# Patient Record
Sex: FEMALE | Race: WHITE | NOT HISPANIC OR LATINO | Employment: FULL TIME | URBAN - METROPOLITAN AREA
[De-identification: names, ages, dates, MRNs, and addresses within clinical notes are randomized per-mention and may not be internally consistent; named-entity substitution may affect disease eponyms.]

---

## 2018-01-12 NOTE — PROGRESS NOTES
Assessment    1  Encounter for preventive health examination (V70 0) (Z00 00)   2  Hypertension (401 9) (I10)   3  Lipid screening (V77 91) (Z13 220)   4  Encounter for screening mammogram for malignant neoplasm of breast (V76 12)   (Z12 31)   5  Allergic rhinitis (477 9) (J30 9)   6  Encounter for screening colonoscopy (V76 51) (Z12 11)    Plan  Allergic rhinitis    · Fluticasone Propionate 50 MCG/ACT Nasal Suspension; USE 2 SPRAYS IN EACH  NOSTRIL ONCE DAILY  Encounter for screening colonoscopy    · 1 Tiffany Nair MD, Sheryl Bui (Gastroenterology) Physician Referral  Consult  Status: Active   Requested for: 97LMO2530  Care Summary provided  : Yes   · COLONOSCOPY; Status:Active; Requested for:10Mar2016;   Encounter for screening mammogram for malignant neoplasm of breast    · * MAMMO SCREENING BILATERAL W CAD; Status:Hold For - Scheduling; Requested  for:10Mar2016;    Health Maintenance    · Follow-up visit in 1 year Evaluation and Treatment  Follow-up  Status: Hold For -  Scheduling  Requested for: 43YSP4329   · Always use a seat belt and shoulder strap when riding or driving a motor vehicle ;  Status:Complete;   Done: 93QJO2146   · Begin a limited exercise program ; Status:Complete;   Done: 04WVC8583   · Brush your teeth 3 times a day and floss at least once a day ; Status:Complete;   Done:  75ZCY4733   · Drink plenty of fluids ; Status:Complete;   Done: 87ZKZ3787   · Eat a low fat and low cholesterol diet ; Status:Complete;   Done: 75LPQ9147   · Regular aerobic exercise can help reduce stress ; Status:Complete;   Done: 20RIZ0944   · Some eating tips that can help you lose weight ; Status:Complete;   Done: 19LKJ2409   · Use a sun block product with an SPF of 15 or more ; Status:Complete;   Done:  84YCU6805   · Vitamins can help you get daily requirements that your diet may not be giving you ;  Status:Complete;   Done: 88JZG0416   · We recommend routine visits to a dentist ; Status:Complete;   Done: 59GHF5562   · Call [phone] if: You find a new or different kind of lump in your breast ;  Status:Complete;   Done: 97LUQ0531   · Call [phone] if: You have any warning signs of skin cancer ; Status:Complete;    Done: 77PNT1509  Hypertension, Lipid screening    · (1) CBC/PLT/DIFF; Status:Active; Requested for:10Mar2016;    · (1) COMPREHENSIVE METABOLIC PANEL; Status:Active; Requested for:10Mar2016;    · (1) LIPID PANEL, FASTING; Status:Active; Requested for:10Mar2016;    · (1) TSH WITH FT4 REFLEX; Status:Active; Requested for:10Mar2016;    · (1) VITAMIN D 25-HYDROXY; Status:Active; Requested for:10Mar2016;     Discussion/Summary  health maintenance visit Currently, she eats an adequate diet and has an inadequate exercise regimen  the risks and benefits of cervical cancer screening were discussed cervical cancer screening is current Breast cancer screening: the risks and benefits of breast cancer screening were discussed and mammogram has been ordered  Colorectal cancer screening: the risks and benefits of colorectal cancer screening were discussed and colonoscopy has been ordered  Osteoporosis screening: the risks and benefits of osteoporosis screening were discussed and bone mineral density testing is not indicated  Screening lab work includes hemoglobin, glucose, lipid profile, thyroid function testing and 25-hydroxyvitamin D  The immunizations are up to date  Advice and education were given regarding aerobic exercise, weight bearing exercise, weight loss, calcium supplements, vitamin D supplements, contraception, alcohol use, sunscreen use, self skin examination, helmet use, seat belt use and advanced directive planning  Patient discussion: discussed with the patient  Chief Complaint  PT is here today has a new PT annual physical  PT would like to discuss her nasal spray  History of Present Illness  HM, Adult Female: The patient is being seen for a health maintenance evaluation   The last health maintenance visit was few year(s) ago  General Health: The patient's health since the last visit is described as good  She does not have regular dental visits  She complains of vision problems  Vision care includes wearing reading glasses and an eye examination within the last year  She denies hearing loss  Immunizations status: up to date  Lifestyle:  She consumes a diverse and healthy diet  She does not have any weight concerns  She does not exercise regularly  She does not use tobacco  She consumes alcohol  She denies drug use  Reproductive health: the patient is postmenopausal   she reports normal menses  she uses no contraception  she is sexually active  pregnancy history: G 3P 2, 2(miscarriages: 1 )  Screening: cancer screening reviewed and updated  metabolic screening reviewed and updated  risk screening reviewed and updated  Hypertension (Follow-Up): The patient presents for follow-up of essential hypertension  The patient states she has been doing well with her blood pressure control since the last visit  She has no comorbid illnesses  Symptoms:      Review of Systems    Constitutional: No fever, no chills, feels well, no tiredness, no recent weight gain or weight loss  Eyes: No complaints of eye pain, no red eyes, no eyesight problems, no discharge, no dry eyes, no itching of eyes  ENT: no complaints of earache, no loss of hearing, no nose bleeds, no nasal discharge, no sore throat, no hoarseness  Cardiovascular: No complaints of slow heart rate, no fast heart rate, no chest pain, no palpitations, no leg claudication, no lower extremity edema  Respiratory: No complaints of shortness of breath, no wheezing, no cough, no SOB on exertion, no orthopnea, no PND  Gastrointestinal: No complaints of abdominal pain, no constipation, no nausea or vomiting, no diarrhea, no bloody stools     Genitourinary: No complaints of dysuria, no incontinence, no pelvic pain, no dysmenorrhea, no vaginal discharge or bleeding  Musculoskeletal: No complaints of arthralgias, no myalgias, no joint swelling or stiffness, no limb pain or swelling  Integumentary: No complaints of skin rash or lesions, no itching, no skin wounds, no breast pain or lump  Neurological: No complaints of headache, no confusion, no convulsions, no numbness, no dizziness or fainting, no tingling, no limb weakness, no difficulty walking  Psychiatric: Not suicidal, no sleep disturbance, no anxiety or depression, no change in personality, no emotional problems  Endocrine: No complaints of proptosis, no hot flashes, no muscle weakness, no deepening of the voice, no feelings of weakness  Hematologic/Lymphatic: No complaints of swollen glands, no swollen glands in the neck, does not bleed easily, does not bruise easily  Active Problems    1  Hypertension (401 9) (I10)    Surgical History    · History of Hysterectomy    Family History    · Family history of hypertension (V17 49) (Z82 49)    · Family history of hypertension (V17 49) (Z82 49)    Social History    · Former smoker (V15 82) (W08 288)   · No drug use   · Social alcohol use (Z78 9)    Current Meds   1  Lisinopril-Hydrochlorothiazide 10-12 5 MG Oral Tablet; Therapy: (Recorded:10Mar2016) to Recorded    Allergies    1  No Known Drug Allergies    Vitals   Recorded: 43CET2068 02:33PM   Heart Rate 84   Respiration 16   Systolic 143   Diastolic 84   Height 5 ft 1 42 in   Weight 153 lb 2 oz   BMI Calculated 28 54   BSA Calculated 1 69     Physical Exam    Constitutional   General appearance: No acute distress, well appearing and well nourished  Head and Face   Head and face: Normal     Palpation of the face and sinuses: No sinus tenderness  Eyes   Conjunctiva and lids: No swelling, erythema or discharge  Pupils and irises: Equal, round, reactive to light  Ophthalmoscopic examination: Normal fundi and optic discs      Ears, Nose, Mouth, and Throat   External inspection of ears and nose: Normal     Otoscopic examination: Tympanic membranes translucent with normal light reflex  Canals patent without erythema  Hearing: Normal     Nasal mucosa, septum, and turbinates: Normal without edema or erythema  Lips, teeth, and gums: Normal, good dentition  Oropharynx: Normal with no erythema, edema, exudate or lesions  Neck   Neck: Supple, symmetric, trachea midline, no masses  Thyroid: Normal, no thyromegaly  Pulmonary   Respiratory effort: No increased work of breathing or signs of respiratory distress  Percussion of chest: Normal     Auscultation of lungs: Clear to auscultation  Cardiovascular   Palpation of heart: Normal PMI, no thrills  Auscultation of heart: Normal rate and rhythm, normal S1 and S2, no murmurs  Examination of extremities for edema and/or varicosities: Normal     Chest   Chest: Normal     Abdomen   Abdomen: Non-tender, no masses  Liver and spleen: No hepatomegaly or splenomegaly  Examination for hernias: No hernia appreciated  Lymphatic   Palpation of lymph nodes in neck: No lymphadenopathy  Palpation of lymph nodes in axillae: No lymphadenopathy  Palpation of lymph nodes in groin: No lymphadenopathy  Musculoskeletal   Gait and station: Normal     Digits and nails: Normal without clubbing or cyanosis  Joints, bones, and muscles: Normal     Range of motion: Normal     Stability: Normal     Muscle strength/tone: Normal     Skin   Skin and subcutaneous tissue: Normal without rashes or lesions  Palpation of skin and subcutaneous tissue: Normal turgor  Neurologic   Cranial nerves: Cranial nerves II-XII intact  Cortical function: Normal mental status  Reflexes: 2+ and symmetric  Sensation: No sensory loss  Coordination: Normal finger to nose and heel to shin  Psychiatric   Judgment and insight: Normal     Orientation to person, place, and time: Normal     Recent and remote memory: Intact      Mood and affect: Normal  Signatures   Electronically signed by : Ok Jeter MD; Mar 10 2016  3:11PM EST                       (Author)

## 2018-01-14 NOTE — RESULT NOTES
Verified Results  (1) COMPREHENSIVE METABOLIC PANEL [id number] 94:73EM Quinton Chakrabortyton Order Number: [id number]      National Kidney Disease Education Program recommendations are as follows:  GFR calculation is accurate only with a steady state creatinine  Chronic Kidney disease less than 60 ml/min/1 73 sq  meters  Kidney failure less than 15 ml/min/1 73 sq  meters  Test Name Result Flag Reference   GLUCOSE,RANDM 93 mg/dL     If the patient is fasting, the ADA then defines impaired fasting glucose as > 100 mg/dL and diabetes as > or equal to 123 mg/dL  SODIUM 138 mmol/L  136-145   POTASSIUM 4 2 mmol/L  3 5-5 3   CHLORIDE 105 mmol/L  100-108   CARBON DIOXIDE 25 mmol/L  21-32   ANION GAP (CALC) 8 mmol/L  4-13   BLOOD UREA NITROGEN 26 mg/dL H 5-25   CREATININE 0 95 mg/dL  0 60-1 30   Standardized to IDMS reference method   CALCIUM 8 8 mg/dL  8 3-10 1   BILI, TOTAL 0 46 mg/dL  0 20-1 00   ALK PHOSPHATAS 105 U/L     ALT (SGPT) 30 U/L  12-78   AST(SGOT) 16 U/L  5-45   ALBUMIN 4 2 g/dL  3 5-5 0   TOTAL PROTEIN 7 6 g/dL  6 4-8 2   eGFR Non-African American      >60 0 ml/min/1 73sq m     (1) TSH WITH FT4 REFLEX [id number] 08:09AM Quinton Chakrabortyton Order Number: [id number]    Patients undergoing fluorescein dye angiography may retain small amounts of fluorescein in the body for 48-72 hours post procedure  Samples containing fluorescein can produce falsely depressed TSH values  If the patient had this procedure,a specimen should be resubmitted post fluorescein clearance          The recommended reference ranges for TSH during pregnancy are as follows:  First trimester 0 1 to 2 5 uIU/mL  Second trimester  0 2 to 3 0 uIU/mL  Third trimester 0 3 to 3 0 uIU/m     Test Name Result Flag Reference   TSH 3 300 uIU/mL  0 358-3 740     (1) LIPID PANEL, FASTING [id number] 08:09AM Olivia Chakraborty    Order Number: [id number]      Triglyceride:         Normal              <150 mg/dl       Borderline High    150-199 mg/dl       High               200-499 mg/dl       Very High          >499 mg/dl  Cholesterol:         Desirable        <200 mg/dl      Borderline High  200-239 mg/dl      High             >239 mg/dl  HDL Cholesterol:        High    >59 mg/dL      Low     <41 mg/dL     Test Name Result Flag Reference   CHOLESTEROL 209 mg/dL H    HDL,DIRECT 44 mg/dL  40-60   LDL CHOLESTEROL CALCULATED 143 mg/dL H 0-100   TRIGLYCERIDES 111 mg/dL  <=150     (1) VITAMIN D 25-HYDROXY [id number] 08:09AM Palmer Felicity Cobb Order Number: [id number]     Order Number: [id number]     Test Name Result Flag Reference   VIT D 25-HYDROX 19 0 ng/mL L 30 0-100 0     (1) CBC/PLT/DIFF [id number] 08:09AM PalmerSeanOlivia    Order Number: [id number]    TW Order Number: [id number]     Test Name Result Flag Reference   WBC COUNT 6 58 Thousand/uL  4 31-10 16   RBC COUNT 5 09 Million/uL  3 81-5 12   HEMOGLOBIN 15 9 g/dL H 11 5-15 4   HEMATOCRIT 45 6 %  34 8-46  1   MCV 90 fL  82-98   MCH 31 2 pg  26 8-34 3   MCHC 34 9 g/dL  31 4-37 4   RDW 12 8 %  11 6-15 1   MPV 11 1 fL  8 9-12 7   PLATELET COUNT 110 Thousands/uL  149-390   nRBC AUTOMATED 0 /100 WBCs     NEUTROPHILS RELATIVE PERCENT 52 %  43-75   LYMPHOCYTES RELATIVE PERCENT 34 %  14-44   MONOCYTES RELATIVE PERCENT 11 %  4-12   EOSINOPHILS RELATIVE PERCENT 2 %  0-6   BASOPHILS RELATIVE PERCENT 1 %  0-1   NEUTROPHILS ABSOLUTE COUNT 3 38 Thousands/µL  1 85-7 62   LYMPHOCYTES ABSOLUTE COUNT 2 25 Thousands/µL  0 60-4 47   MONOCYTES ABSOLUTE COUNT 0 71 Thousand/µL  0 17-1 22   EOSINOPHILS ABSOLUTE COUNT 0 15 Thousand/µL  0 00-0 61   BASOPHILS ABSOLUTE COUNT 0 04 Thousands/µL  0 00-0 10       Plan  Vitamin D deficiency    · Vitamin D3 92336 UNIT Oral Capsule; TAKE 1 CAPSULE Weekly    Discussion/Summary   slightly high bad cholesterol  watch sweets and fatty food   very low vitamin D   take vitamin D 50,000 IU once a week for 8 weeks   then take over the counter Vitamin D 2000 IU once a day    all other blood work within the range   - Dr Kulwinder Monaco   Electronically signed by : Leonor Bui MD; Mar 16 2016  8:16AM EST                       (Author)

## 2018-11-15 ENCOUNTER — OFFICE VISIT (OUTPATIENT)
Dept: FAMILY MEDICINE CLINIC | Facility: CLINIC | Age: 53
End: 2018-11-15
Payer: COMMERCIAL

## 2018-11-15 VITALS
RESPIRATION RATE: 16 BRPM | WEIGHT: 152 LBS | DIASTOLIC BLOOD PRESSURE: 84 MMHG | HEIGHT: 61 IN | OXYGEN SATURATION: 99 % | BODY MASS INDEX: 28.7 KG/M2 | TEMPERATURE: 96.5 F | HEART RATE: 78 BPM | SYSTOLIC BLOOD PRESSURE: 122 MMHG

## 2018-11-15 DIAGNOSIS — Z23 NEED FOR INFLUENZA VACCINATION: ICD-10-CM

## 2018-11-15 DIAGNOSIS — Z12.39 SCREENING FOR BREAST CANCER: ICD-10-CM

## 2018-11-15 DIAGNOSIS — Z00.00 ENCOUNTER FOR WELL ADULT EXAM WITHOUT ABNORMAL FINDINGS: Primary | ICD-10-CM

## 2018-11-15 DIAGNOSIS — Z12.31 VISIT FOR SCREENING MAMMOGRAM: ICD-10-CM

## 2018-11-15 DIAGNOSIS — E55.9 VITAMIN D DEFICIENCY: ICD-10-CM

## 2018-11-15 DIAGNOSIS — I10 ESSENTIAL HYPERTENSION: ICD-10-CM

## 2018-11-15 DIAGNOSIS — Z12.11 COLON CANCER SCREENING: ICD-10-CM

## 2018-11-15 PROCEDURE — 90471 IMMUNIZATION ADMIN: CPT

## 2018-11-15 PROCEDURE — 90686 IIV4 VACC NO PRSV 0.5 ML IM: CPT

## 2018-11-15 PROCEDURE — 99396 PREV VISIT EST AGE 40-64: CPT | Performed by: FAMILY MEDICINE

## 2018-11-15 RX ORDER — LISINOPRIL AND HYDROCHLOROTHIAZIDE 12.5; 1 MG/1; MG/1
1 TABLET ORAL DAILY
Qty: 90 TABLET | Refills: 3 | Status: SHIPPED | OUTPATIENT
Start: 2018-11-15 | End: 2020-01-27 | Stop reason: SDUPTHER

## 2018-11-15 RX ORDER — LISINOPRIL AND HYDROCHLOROTHIAZIDE 12.5; 1 MG/1; MG/1
1 TABLET ORAL DAILY
COMMUNITY
End: 2018-11-15 | Stop reason: SDUPTHER

## 2018-11-15 NOTE — PROGRESS NOTES
Sheela Hooper was seen today for annual exam     Diagnoses and all orders for this visit:    Encounter for well adult exam without abnormal findings    Need for influenza vaccination  -     SYRINGE/SINGLE-DOSE VIAL: influenza vaccine, 2121-1016, quadrivalent, 0 5 mL, preservative-free, for patients 3+ yr ([address])    Screening for breast cancer  -     Mammo screening bilateral w cad; Future    Essential hypertension  -     Comprehensive metabolic panel  -     TSH, 3rd generation with Free T4 reflex; Future  -     Lipid Panel with Direct LDL reflex; Future  -     lisinopril-hydrochlorothiazide (PRINZIDE,ZESTORETIC) 10-12 5 MG per tablet; Take 1 tablet by mouth daily    Vitamin D deficiency  -     Vitamin D 25 hydroxy; Future    Colon cancer screening  -     Ambulatory referral to Gastroenterology; Future    Visit for screening mammogram  -     Mammo screening bilateral w cad; Future      Patient Counseling:  --Nutrition: Stressed importance of moderation in sodium/caffeine intake, saturated fat and cholesterol, caloric balance, sufficient intake of fresh fruits, vegetables, fiber, calcium, iron, and 1 mg of folate supplement per day   --Discussed  calcium supplement, and the daily use of baby aspirin  --Exercise: Stressed the importance of regular exercise  --Substance Abuse: Discussed cessation/primary prevention of tobacco, alcohol, or other drug use; driving or other dangerous activities under the influence; availability of treatment for abuse  --Sexuality: Discussed sexually transmitted diseases, partner selection, use of condoms, avoidance of unintended pregnancy  and contraceptive alternatives  --Injury prevention: Discussed safety belts, safety helmets, smoke detector, smoking near bedding or upholstery  --Dental health: Discussed importance of regular tooth brushing, flossing, and dental visits  --Immunizations reviewed    --Discussed benefits of screening colonoscopy    Chief Complaint   Patient presents with    Annual Exam       HPI    Patient here for a comprehensive physical exam The patient reports no problems    Do you take any herbs or supplements that were not prescribed by a doctor? no   Are you taking calcium supplements? no   Are you taking aspirin daily? no  How often do you exercise? Walking at work   Diet?  2-3 servings of fruits and vegetables   Dental visit: every 6 months     Vision test: wears glasses for reading     Colonoscopy:  Never had one done   Mammogram: never had one done      History:  LMP: had hysterectomy 10 year ago     Last pap date:  10 years ago   Abnormal pap? no  : 2  Para: 3 22year old and 24year old   2 grand kids     History   Sexual Activity    Sexual activity: Yes    Partners: Male             Review of Systems   Constitutional: Negative  HENT: Negative  Eyes: Negative  Respiratory: Negative  Cardiovascular: Negative  Gastrointestinal: Negative  Endocrine: Negative  Genitourinary: Negative  Musculoskeletal: Negative  Skin: Negative  Allergic/Immunologic: Negative  Neurological: Negative  Hematological: Negative  Psychiatric/Behavioral: Negative  Family History   Problem Relation Age of Onset    Hypertension Mother     Hyperlipidemia Mother     Hypertension Father        Past Medical History:   Diagnosis Date    Hypertension          Social History     Social History    Marital status: Single     Spouse name: N/A    Number of children: N/A    Years of education: N/A     Occupational History    Not on file  Social History Main Topics    Smoking status: Never Smoker    Smokeless tobacco: Never Used    Alcohol use Yes      Comment: social    Drug use: No    Sexual activity: Yes     Partners: Male     Other Topics Concern    Not on file     Social History Narrative    No narrative on file       No current outpatient prescriptions on file prior to visit       No current facility-administered medications on file prior to visit  No Known Allergies      Vitals:    11/15/18 0926   BP: 122/84   BP Location: Left arm   Patient Position: Sitting   Cuff Size: Standard   Pulse: 78   Resp: 16   Temp: (!) 96 5 °F (35 8 °C)   SpO2: 99%   Weight: 68 9 kg (152 lb)   Height: 5' 1" (1 549 m)         Physical Exam   Constitutional: She is oriented to person, place, and time  Vital signs are normal  She appears well-developed and well-nourished  HENT:   Head: Normocephalic and atraumatic  Nose: Nose normal    Mouth/Throat: Oropharynx is clear and moist    Eyes: Pupils are equal, round, and reactive to light  Neck: Normal range of motion  Neck supple  No thyromegaly present  Cardiovascular: Normal rate and regular rhythm  No murmur heard  Pulmonary/Chest: Effort normal and breath sounds normal    Abdominal: Soft  Bowel sounds are normal    Musculoskeletal: Normal range of motion  She exhibits no edema or deformity  Neurological: She is alert and oriented to person, place, and time  She has normal reflexes  Skin: Skin is warm  No rash noted  No erythema  Psychiatric: She has a normal mood and affect   Her behavior is normal

## 2019-06-27 DIAGNOSIS — Z12.39 ENCOUNTER FOR SCREENING FOR MALIGNANT NEOPLASM OF BREAST: Primary | ICD-10-CM

## 2020-01-26 DIAGNOSIS — I10 ESSENTIAL HYPERTENSION: ICD-10-CM

## 2020-01-27 DIAGNOSIS — I10 ESSENTIAL HYPERTENSION: ICD-10-CM

## 2020-01-27 RX ORDER — LISINOPRIL AND HYDROCHLOROTHIAZIDE 12.5; 1 MG/1; MG/1
1 TABLET ORAL DAILY
Qty: 60 TABLET | Refills: 0 | Status: SHIPPED | OUTPATIENT
Start: 2020-01-27 | End: 2020-03-10 | Stop reason: SDUPTHER

## 2020-01-27 NOTE — TELEPHONE ENCOUNTER
Patient called wanting a refill on meds but has not seen Dr Scott Kim since 11/15/2018  She has an appointment on 03/10/20  Can this be refilled before her upcoming appointment  Please advise         Medication:lisinopril-hydrochlorothiazide       Dosage:10-12 5 MG per tablet       How Often:Take 1 tablet by mouth daily  Quantity:  90  Last Office Visit: 11/15/118  Next Office Visit:03/10/20  Last refilled:11/15/2018  How many pills left:4  Pharmacy:   [address] [place] [address]  [facility] [address] [place] [id number]  Phone: [phone] Fax: [phone]

## 2020-01-27 NOTE — TELEPHONE ENCOUNTER
Please check to see if she is still a patient here   I will refill it this time but she needs to come in for future refills

## 2020-01-28 RX ORDER — LISINOPRIL AND HYDROCHLOROTHIAZIDE 12.5; 1 MG/1; MG/1
TABLET ORAL
Qty: 30 TABLET | Refills: 0 | OUTPATIENT
Start: 2020-01-28

## 2020-03-10 ENCOUNTER — OFFICE VISIT (OUTPATIENT)
Dept: FAMILY MEDICINE CLINIC | Facility: CLINIC | Age: 55
End: 2020-03-10
Payer: COMMERCIAL

## 2020-03-10 ENCOUNTER — TELEPHONE (OUTPATIENT)
Dept: FAMILY MEDICINE CLINIC | Facility: CLINIC | Age: 55
End: 2020-03-10

## 2020-03-10 VITALS
HEIGHT: 63 IN | WEIGHT: 153.8 LBS | HEART RATE: 79 BPM | DIASTOLIC BLOOD PRESSURE: 80 MMHG | SYSTOLIC BLOOD PRESSURE: 118 MMHG | TEMPERATURE: 98 F | BODY MASS INDEX: 27.25 KG/M2 | OXYGEN SATURATION: 97 % | RESPIRATION RATE: 16 BRPM

## 2020-03-10 DIAGNOSIS — Z00.00 ANNUAL PHYSICAL EXAM: ICD-10-CM

## 2020-03-10 DIAGNOSIS — Z12.31 VISIT FOR SCREENING MAMMOGRAM: ICD-10-CM

## 2020-03-10 DIAGNOSIS — Z12.11 COLON CANCER SCREENING: Primary | ICD-10-CM

## 2020-03-10 DIAGNOSIS — I10 ESSENTIAL HYPERTENSION: ICD-10-CM

## 2020-03-10 PROCEDURE — 3074F SYST BP LT 130 MM HG: CPT | Performed by: FAMILY MEDICINE

## 2020-03-10 PROCEDURE — 3079F DIAST BP 80-89 MM HG: CPT | Performed by: FAMILY MEDICINE

## 2020-03-10 PROCEDURE — 99396 PREV VISIT EST AGE 40-64: CPT | Performed by: FAMILY MEDICINE

## 2020-03-10 PROCEDURE — 3008F BODY MASS INDEX DOCD: CPT | Performed by: FAMILY MEDICINE

## 2020-03-10 RX ORDER — LISINOPRIL AND HYDROCHLOROTHIAZIDE 12.5; 1 MG/1; MG/1
1 TABLET ORAL DAILY
Qty: 90 TABLET | Refills: 3 | Status: SHIPPED | OUTPATIENT
Start: 2020-03-10 | End: 2021-03-12 | Stop reason: SDUPTHER

## 2020-03-10 NOTE — PATIENT INSTRUCTIONS

## 2020-03-10 NOTE — PROGRESS NOTES
[address]    NAME: Sid Marie  AGE: 49 y o  SEX: female  : 1965     DATE: 3/10/2020     Assessment and Plan:     Problem List Items Addressed This Visit     None      Visit Diagnoses     Colon cancer screening    -  Primary    Relevant Orders    Ambulatory referral to Gastroenterology    Annual physical exam        Relevant Orders    Comprehensive metabolic panel    Lipid Panel with Direct LDL reflex    CBC and differential    Visit for screening mammogram        Relevant Orders    Mammo screening bilateral w cad          Immunizations and preventive care screenings were discussed with patient today  Appropriate education was printed on patient's after visit summary  Counseling:  Alcohol/drug use: discussed moderation in alcohol intake, the recommendations for healthy alcohol use, and avoidance of illicit drug use  Dental Health: discussed importance of regular tooth brushing, flossing, and dental visits  Injury prevention: discussed safety/seat belts, safety helmets, smoke detectors, carbon dioxide detectors, and smoking near bedding or upholstery  Sexual health: discussed sexually transmitted diseases, partner selection, use of condoms, avoidance of unintended pregnancy, and contraceptive alternatives  · Exercise: the importance of regular exercise/physical activity was discussed  Recommend exercise 3-5 times per week for at least 30 minutes  BMI Counseling: Body mass index is 27 08 kg/m²  The BMI is above normal  Nutrition recommendations include decreasing portion sizes and encouraging healthy choices of fruits and vegetables  Exercise recommendations include moderate physical activity 150 minutes/week  No pharmacotherapy was ordered  No follow-ups on file       Chief Complaint:     Chief Complaint   Patient presents with    Annual Exam     patient is here for physical exam      History of Present Illness:     Adult Annual Physical   Patient here for a comprehensive physical exam  The patient reports no problems  Lives with her daughter    Diet and Physical Activity  · Diet/Nutrition: well balanced diet and consuming 3-5 servings of fruits/vegetables daily   · Exercise: walking  Depression Screening  PHQ-9 Depression Screening    PHQ-9:    Frequency of the following problems over the past two weeks:       Little interest or pleasure in doing things:  0 - not at all  Feeling down, depressed, or hopeless:  0 - not at all  PHQ-2 Score:  0       General Health  · Sleep: sleeps well and gets 4-6 hours of sleep on average  · Hearing: normal - bilateral   · Vision: most recent eye exam >1 year ago and wears glasses  · Dental: regular dental visits and brushes teeth twice daily  /GYN Health  · Patient is: postmenopausal  · Last menstrual period: 2010  · Contraceptive method: none  Review of Systems:     Review of Systems   Constitutional: Negative  HENT: Negative  Eyes: Negative  Respiratory: Negative  Cardiovascular: Negative  Gastrointestinal: Negative  Endocrine: Negative  Genitourinary: Negative  Musculoskeletal: Negative  Skin: Negative  Allergic/Immunologic: Negative  Neurological: Negative  Negative for dizziness and facial asymmetry  Hematological: Negative  Psychiatric/Behavioral: Negative         Past Medical History:     Past Medical History:   Diagnosis Date    Hypertension       Past Surgical History:     Past Surgical History:   Procedure Laterality Date    CHOLECYSTECTOMY      HYSTERECTOMY      WISDOM TOOTH EXTRACTION        Social History:        Social History     Socioeconomic History    Marital status: Single     Spouse name: None    Number of children: None    Years of education: None    Highest education level: None   Occupational History    None   Social Needs    Financial resource strain: None    Food insecurity: Worry: None     Inability: None    Transportation needs:     Medical: None     Non-medical: None   Tobacco Use    Smoking status: Former Smoker    Smokeless tobacco: Never Used    Tobacco comment: former smoker, per allscripts   Substance and Sexual Activity    Alcohol use: Yes     Comment: social    Drug use: No    Sexual activity: Yes     Partners: Male   Lifestyle    Physical activity:     Days per week: None     Minutes per session: None    Stress: None   Relationships    Social connections:     Talks on phone: None     Gets together: None     Attends Congregational service: None     Active member of club or organization: None     Attends meetings of clubs or organizations: None     Relationship status: None    Intimate partner violence:     Fear of current or ex partner: None     Emotionally abused: None     Physically abused: None     Forced sexual activity: None   Other Topics Concern    None   Social History Narrative    None      Family History:     Family History   Problem Relation Age of Onset    Hypertension Mother     Hyperlipidemia Mother     Hypertension Father       Current Medications:     Current Outpatient Medications   Medication Sig Dispense Refill    cholecalciferol (VITAMIN D3) 1,000 units tablet Take 1 capsule by mouth once a week      lisinopril-hydrochlorothiazide (PRINZIDE,ZESTORETIC) 10-12 5 MG per tablet Take 1 tablet by mouth daily 60 tablet 0     No current facility-administered medications for this visit  Allergies:     No Known Allergies   Physical Exam:     /80 (BP Location: Right arm, Patient Position: Sitting, Cuff Size: Large)   Pulse 79   Temp 98 °F (36 7 °C) (Tympanic)   Resp 16   Ht 5' 3 19" (1 605 m) Comment: with shoes  Wt 69 8 kg (153 lb 12 8 oz)   SpO2 97%   BMI 27 08 kg/m²     Physical Exam   Constitutional: She is oriented to person, place, and time  Vital signs are normal  She appears well-developed and well-nourished     HENT:   Head: Normocephalic and atraumatic  Nose: Nose normal    Mouth/Throat: Oropharynx is clear and moist    Eyes: Pupils are equal, round, and reactive to light  Neck: Normal range of motion  Neck supple  No thyromegaly present  Cardiovascular: Normal rate and regular rhythm  No murmur heard  Pulmonary/Chest: Effort normal and breath sounds normal    Abdominal: Soft  Bowel sounds are normal    Musculoskeletal: Normal range of motion  She exhibits no edema or deformity  Neurological: She is alert and oriented to person, place, and time  She has normal reflexes  No cranial nerve deficit  Coordination normal    Skin: Skin is warm  No rash noted  No erythema  Psychiatric: She has a normal mood and affect   Her behavior is normal        Nicole Mckinney MD  [address] [facility]

## 2020-03-10 NOTE — TELEPHONE ENCOUNTER
Medication:lisinopril-hydrochlorothiazide (PRINZIDE,ZESTORETIC) 10-12 5 MG per tablet      Dosage: 1 tablet  How Often:Sig: Take 1 tablet by mouth daily  Quantity:  90 (insurance will cover 90 days)  Last Office Visit: 3/10/2020  Next Office Visit:6/2/2020  Last refilled:1/27/2020  How many pills left: couple  Pharmacy:   [facility]/pharmacy #[id number]- [facility] [id number]   [facility] [address]  Phone: [phone] Fax: [phone]

## 2020-09-17 ENCOUNTER — TELEPHONE (OUTPATIENT)
Dept: GASTROENTEROLOGY | Facility: AMBULARY SURGERY CENTER | Age: 55
End: 2020-09-17

## 2021-03-12 DIAGNOSIS — I10 ESSENTIAL HYPERTENSION: ICD-10-CM

## 2021-03-12 RX ORDER — LISINOPRIL AND HYDROCHLOROTHIAZIDE 12.5; 1 MG/1; MG/1
1 TABLET ORAL DAILY
Qty: 30 TABLET | Refills: 0 | Status: SHIPPED | OUTPATIENT
Start: 2021-03-12 | End: 2021-04-22 | Stop reason: SDUPTHER

## 2021-03-12 NOTE — TELEPHONE ENCOUNTER
Medication: lisinopril-hydrochlorothiazide (PRINZIDE,ZESTORETIC)  Dosage:10-12 5 MG per tablet  How Often: Take 1 tablet by mouth daily  Quantity:  90  Last Office Visit: 03/10/2021  Next Office Visit: 05/10/2021  Last refilled: December 2020  How many pills left: 12  Pharmacy:   [facility]/pharmacy #[id number]- [facility] [place] - [id number] [place] [address]  Phone: [phone] Fax: [phone]

## 2021-04-22 DIAGNOSIS — I10 ESSENTIAL HYPERTENSION: ICD-10-CM

## 2021-04-22 RX ORDER — LISINOPRIL AND HYDROCHLOROTHIAZIDE 12.5; 1 MG/1; MG/1
1 TABLET ORAL DAILY
Qty: 30 TABLET | Refills: 0 | Status: SHIPPED | OUTPATIENT
Start: 2021-04-22 | End: 2021-05-10 | Stop reason: SDUPTHER

## 2021-04-22 NOTE — TELEPHONE ENCOUNTER
Medication:lisinopril-hydrochlorothiazide (PRINZIDE,ZESTORETIC) 10-12 5 MG       Dosage:1 tablet  How Often:Take 1 tablet by mouth daily  Quantity:  30  Last Office Visit: 03/10/20  Next Office Visit:05/10/21  Last refilled:03/12/21  How many pills left:7  Pharmacy:   [facility]/pharmacy #[id number]- [place] - [address] [facility] [address] [facility]  [address]   [facility] [address]  Phone: [phone] Fax: [phone]

## 2021-05-10 ENCOUNTER — OFFICE VISIT (OUTPATIENT)
Dept: FAMILY MEDICINE CLINIC | Facility: CLINIC | Age: 56
End: 2021-05-10
Payer: COMMERCIAL

## 2021-05-10 VITALS
BODY MASS INDEX: 26.72 KG/M2 | RESPIRATION RATE: 16 BRPM | TEMPERATURE: 99.1 F | HEART RATE: 82 BPM | HEIGHT: 63 IN | DIASTOLIC BLOOD PRESSURE: 80 MMHG | OXYGEN SATURATION: 97 % | WEIGHT: 150.8 LBS | SYSTOLIC BLOOD PRESSURE: 130 MMHG

## 2021-05-10 DIAGNOSIS — E55.9 VITAMIN D DEFICIENCY: ICD-10-CM

## 2021-05-10 DIAGNOSIS — Z11.4 ENCOUNTER FOR SCREENING FOR HIV: ICD-10-CM

## 2021-05-10 DIAGNOSIS — I10 ESSENTIAL HYPERTENSION: ICD-10-CM

## 2021-05-10 DIAGNOSIS — Z12.11 COLON CANCER SCREENING: ICD-10-CM

## 2021-05-10 DIAGNOSIS — Z12.31 VISIT FOR SCREENING MAMMOGRAM: ICD-10-CM

## 2021-05-10 DIAGNOSIS — Z00.00 ANNUAL PHYSICAL EXAM: Primary | ICD-10-CM

## 2021-05-10 PROCEDURE — 3725F SCREEN DEPRESSION PERFORMED: CPT | Performed by: FAMILY MEDICINE

## 2021-05-10 PROCEDURE — 1036F TOBACCO NON-USER: CPT | Performed by: FAMILY MEDICINE

## 2021-05-10 PROCEDURE — 3008F BODY MASS INDEX DOCD: CPT | Performed by: FAMILY MEDICINE

## 2021-05-10 PROCEDURE — 99396 PREV VISIT EST AGE 40-64: CPT | Performed by: FAMILY MEDICINE

## 2021-05-10 RX ORDER — LISINOPRIL AND HYDROCHLOROTHIAZIDE 12.5; 1 MG/1; MG/1
1 TABLET ORAL DAILY
Qty: 90 TABLET | Refills: 3 | Status: SHIPPED | OUTPATIENT
Start: 2021-05-10 | End: 2022-06-01

## 2021-05-10 NOTE — PROGRESS NOTES
[address]    NAME: Catalina Falk  AGE: 48 y o  SEX: female  : 1965     DATE: 5/10/2021     Assessment and Plan:     Problem List Items Addressed This Visit        Other    Vitamin D deficiency    Relevant Orders    Vitamin D 25 hydroxy      Other Visit Diagnoses     Annual physical exam    -  Primary    Relevant Orders    Comprehensive metabolic panel    Lipid Panel with Direct LDL reflex    CBC and differential    Colon cancer screening        Relevant Orders    Ambulatory referral to Gastroenterology    Visit for screening mammogram        Relevant Orders    Mammo screening bilateral w cad    Encounter for screening for HIV        Relevant Orders    HIV 1/2 Antigen/Antibody (4th Generation) w Reflex [facility]          Immunizations and preventive care screenings were discussed with patient today  Appropriate education was printed on patient's after visit summary  Counseling:  Alcohol/drug use: discussed moderation in alcohol intake, the recommendations for healthy alcohol use, and avoidance of illicit drug use  Dental Health: discussed importance of regular tooth brushing, flossing, and dental visits  Injury prevention: discussed safety/seat belts, safety helmets, smoke detectors, carbon dioxide detectors, and smoking near bedding or upholstery  Sexual health: discussed sexually transmitted diseases, partner selection, use of condoms, avoidance of unintended pregnancy, and contraceptive alternatives  · Exercise: the importance of regular exercise/physical activity was discussed  Recommend exercise 3-5 times per week for at least 30 minutes  BMI Counseling: Body mass index is 26 55 kg/m²  The BMI is above normal  Nutrition recommendations include decreasing portion sizes and encouraging healthy choices of fruits and vegetables  Exercise recommendations include moderate physical activity 150 minutes/week   No pharmacotherapy was ordered  No follow-ups on file  Chief Complaint:     Chief Complaint   Patient presents with    Physical Exam     Patient is here today for an annual exam       History of Present Illness:     Adult Annual Physical   Patient here for a comprehensive physical exam  The patient reports no problems  Diet and Physical Activity  · Diet/Nutrition: well balanced diet and consuming 3-5 servings of fruits/vegetables daily  · Exercise: walking  Depression Screening  PHQ-9 Depression Screening    PHQ-9:   Frequency of the following problems over the past two weeks:      Little interest or pleasure in doing things: 0 - not at all  Feeling down, depressed, or hopeless: 0 - not at all  PHQ-2 Score: 0       General Health  · Sleep: sleeps well and gets 7-8 hours of sleep on average  · Hearing: normal - bilateral   · Vision: goes for regular eye exams and wears glasses  · Dental: regular dental visits and brushes teeth twice daily  /GYN Health  · Patient is: postmenopausal  · Last menstrual period: hysterectomy around 2010  · Contraceptive method: none  Review of Systems:     Review of Systems   Constitutional: Negative  Negative for chills and fever  HENT: Negative  Negative for ear pain and sore throat  Eyes: Negative  Negative for pain and visual disturbance  Respiratory: Negative  Negative for cough and shortness of breath  Cardiovascular: Negative  Negative for chest pain and palpitations  Gastrointestinal: Negative  Negative for abdominal pain and vomiting  Endocrine: Negative  Genitourinary: Negative  Negative for dysuria and hematuria  Musculoskeletal: Negative for arthralgias and back pain  Skin: Negative for color change and rash  Allergic/Immunologic: Negative  Neurological: Negative  Negative for seizures and syncope  Hematological: Negative  Psychiatric/Behavioral: Negative      All other systems reviewed and are negative       Past Medical History:     Past Medical History:   Diagnosis Date    Hypertension       Past Surgical History:     Past Surgical History:   Procedure Laterality Date    CHOLECYSTECTOMY      HYSTERECTOMY      WISDOM TOOTH EXTRACTION        Social History:        Social History     Socioeconomic History    Marital status: Single     Spouse name: None    Number of children: None    Years of education: None    Highest education level: None   Occupational History    None   Social Needs    Financial resource strain: None    Food insecurity     Worry: None     Inability: None    Transportation needs     Medical: None     Non-medical: None   Tobacco Use    Smoking status: Former Smoker    Smokeless tobacco: Never Used    Tobacco comment: former smoker, per allscripts   Substance and Sexual Activity    Alcohol use: Yes     Comment: social    Drug use: No    Sexual activity: Yes     Partners: Male   Lifestyle    Physical activity     Days per week: None     Minutes per session: None    Stress: None   Relationships    Social connections     Talks on phone: None     Gets together: None     Attends Religion service: None     Active member of club or organization: None     Attends meetings of clubs or organizations: None     Relationship status: None    Intimate partner violence     Fear of current or ex partner: None     Emotionally abused: None     Physically abused: None     Forced sexual activity: None   Other Topics Concern    None   Social History Narrative    None      Family History:     Family History   Problem Relation Age of Onset    Hypertension Mother     Hyperlipidemia Mother     Hypertension Father       Current Medications:     Current Outpatient Medications   Medication Sig Dispense Refill    cholecalciferol (VITAMIN D3) 1,000 units tablet Take 1 capsule by mouth once a week      lisinopril-hydrochlorothiazide (PRINZIDE,ZESTORETIC) 10-12 5 MG per tablet Take 1 tablet by mouth daily 30 tablet 0     No current facility-administered medications for this visit  Allergies:     No Known Allergies   Physical Exam:     /80 (BP Location: Left arm, Patient Position: Sitting, Cuff Size: Adult)   Pulse 82   Temp 99 1 °F (37 3 °C) (Tympanic)   Resp 16   Ht 5' 3 19" (1 605 m)   Wt 68 4 kg (150 lb 12 8 oz)   LMP  (Approximate)   SpO2 97%   BMI 26 55 kg/m²     Physical Exam  Vitals signs and nursing note reviewed  Constitutional:       General: She is not in acute distress  Appearance: She is well-developed  HENT:      Head: Normocephalic and atraumatic  Right Ear: Tympanic membrane normal       Left Ear: Tympanic membrane normal       Nose: Nose normal       Mouth/Throat:      Mouth: Mucous membranes are moist       Pharynx: No oropharyngeal exudate or posterior oropharyngeal erythema  Eyes:      Conjunctiva/sclera: Conjunctivae normal    Neck:      Musculoskeletal: Neck supple  Cardiovascular:      Rate and Rhythm: Normal rate and regular rhythm  Heart sounds: No murmur  Pulmonary:      Effort: Pulmonary effort is normal  No respiratory distress  Breath sounds: Normal breath sounds  Abdominal:      Palpations: Abdomen is soft  Tenderness: There is no abdominal tenderness  Skin:     General: Skin is warm and dry  Capillary Refill: Capillary refill takes less than 2 seconds  Neurological:      General: No focal deficit present  Mental Status: She is alert and oriented to person, place, and time     Psychiatric:         Mood and Affect: Mood normal          Behavior: Behavior normal           Sharon Snell MD  [address] [facility]

## 2021-05-10 NOTE — PATIENT INSTRUCTIONS

## 2021-08-10 ENCOUNTER — TELEPHONE (OUTPATIENT)
Dept: FAMILY MEDICINE CLINIC | Facility: CLINIC | Age: 56
End: 2021-08-10

## 2021-08-10 NOTE — TELEPHONE ENCOUNTER
Called and left a message for the patient to call back to see if she is interested in doing the cologuard

## 2021-08-10 NOTE — TELEPHONE ENCOUNTER
----- Message from Lang Clark MD sent at 8/6/2021  7:53 AM EDT -----  Regarding: colonoscopy  Our record indicates that she never got her colonoscopy done  Would she be interested in doing cologuard if she doesn't have family or personal h/o colon cancer?       Dr May Cons

## 2021-10-07 ENCOUNTER — PREP FOR PROCEDURE (OUTPATIENT)
Dept: GASTROENTEROLOGY | Facility: AMBULARY SURGERY CENTER | Age: 56
End: 2021-10-07

## 2021-10-07 ENCOUNTER — TELEPHONE (OUTPATIENT)
Dept: GASTROENTEROLOGY | Facility: AMBULARY SURGERY CENTER | Age: 56
End: 2021-10-07

## 2021-10-07 DIAGNOSIS — Z12.11 SPECIAL SCREENING FOR MALIGNANT NEOPLASMS, COLON: Primary | ICD-10-CM

## 2021-11-08 ENCOUNTER — TELEPHONE (OUTPATIENT)
Dept: GASTROENTEROLOGY | Facility: AMBULARY SURGERY CENTER | Age: 56
End: 2021-11-08

## 2021-11-08 ENCOUNTER — ANESTHESIA (OUTPATIENT)
Dept: ANESTHESIOLOGY | Facility: HOSPITAL | Age: 56
End: 2021-11-08

## 2021-11-08 ENCOUNTER — ANESTHESIA EVENT (OUTPATIENT)
Dept: ANESTHESIOLOGY | Facility: HOSPITAL | Age: 56
End: 2021-11-08

## 2021-11-08 RX ORDER — SODIUM CHLORIDE, SODIUM LACTATE, POTASSIUM CHLORIDE, CALCIUM CHLORIDE 600; 310; 30; 20 MG/100ML; MG/100ML; MG/100ML; MG/100ML
125 INJECTION, SOLUTION INTRAVENOUS CONTINUOUS
Status: CANCELLED | OUTPATIENT
Start: 2021-11-08

## 2021-11-08 RX ORDER — LIDOCAINE HYDROCHLORIDE 10 MG/ML
0.5 INJECTION, SOLUTION EPIDURAL; INFILTRATION; INTRACAUDAL; PERINEURAL ONCE AS NEEDED
Status: CANCELLED | OUTPATIENT
Start: 2021-11-08

## 2021-11-09 ENCOUNTER — HOSPITAL ENCOUNTER (OUTPATIENT)
Dept: GASTROENTEROLOGY | Facility: AMBULARY SURGERY CENTER | Age: 56
Setting detail: OUTPATIENT SURGERY
Discharge: HOME | End: 2021-11-09
Attending: INTERNAL MEDICINE | Admitting: INTERNAL MEDICINE
Payer: COMMERCIAL

## 2021-11-09 ENCOUNTER — ANESTHESIA (OUTPATIENT)
Dept: GASTROENTEROLOGY | Facility: AMBULARY SURGERY CENTER | Age: 56
End: 2021-11-09

## 2021-11-09 ENCOUNTER — ANESTHESIA EVENT (OUTPATIENT)
Dept: GASTROENTEROLOGY | Facility: AMBULARY SURGERY CENTER | Age: 56
End: 2021-11-09

## 2021-11-09 VITALS
HEART RATE: 65 BPM | TEMPERATURE: 96.3 F | SYSTOLIC BLOOD PRESSURE: 117 MMHG | OXYGEN SATURATION: 97 % | RESPIRATION RATE: 18 BRPM | DIASTOLIC BLOOD PRESSURE: 74 MMHG

## 2021-11-09 DIAGNOSIS — Z12.11 SPECIAL SCREENING FOR MALIGNANT NEOPLASMS, COLON: ICD-10-CM

## 2021-11-09 PROCEDURE — 88305 TISSUE EXAM BY PATHOLOGIST: CPT | Performed by: PATHOLOGY

## 2021-11-09 PROCEDURE — 45385 COLONOSCOPY W/LESION REMOVAL: CPT | Performed by: INTERNAL MEDICINE

## 2021-11-09 RX ORDER — SODIUM CHLORIDE, SODIUM LACTATE, POTASSIUM CHLORIDE, CALCIUM CHLORIDE 600; 310; 30; 20 MG/100ML; MG/100ML; MG/100ML; MG/100ML
INJECTION, SOLUTION INTRAVENOUS CONTINUOUS PRN
Status: DISCONTINUED | OUTPATIENT
Start: 2021-11-09 | End: 2021-11-09

## 2021-11-09 RX ORDER — PROPOFOL 10 MG/ML
INJECTION, EMULSION INTRAVENOUS AS NEEDED
Status: DISCONTINUED | OUTPATIENT
Start: 2021-11-09 | End: 2021-11-09

## 2021-11-09 RX ADMIN — PROPOFOL 50 MG: 10 INJECTION, EMULSION INTRAVENOUS at 11:47

## 2021-11-09 RX ADMIN — PROPOFOL 50 MG: 10 INJECTION, EMULSION INTRAVENOUS at 11:49

## 2021-11-09 RX ADMIN — PROPOFOL 100 MG: 10 INJECTION, EMULSION INTRAVENOUS at 11:43

## 2021-11-09 RX ADMIN — SODIUM CHLORIDE, SODIUM LACTATE, POTASSIUM CHLORIDE, AND CALCIUM CHLORIDE: .6; .31; .03; .02 INJECTION, SOLUTION INTRAVENOUS at 11:43

## 2021-11-09 RX ADMIN — PROPOFOL 50 MG: 10 INJECTION, EMULSION INTRAVENOUS at 11:45

## 2021-11-09 RX ADMIN — PROPOFOL 50 MG: 10 INJECTION, EMULSION INTRAVENOUS at 11:52

## 2022-09-03 DIAGNOSIS — I10 ESSENTIAL HYPERTENSION: ICD-10-CM

## 2022-09-05 RX ORDER — LISINOPRIL AND HYDROCHLOROTHIAZIDE 12.5; 1 MG/1; MG/1
TABLET ORAL
Qty: 90 TABLET | Refills: 0 | Status: SHIPPED | OUTPATIENT
Start: 2022-09-05

## 2022-09-05 NOTE — TELEPHONE ENCOUNTER
I havent seen this patient since 5/2021  She hasnt labs done for years now as well  Please call the patient to schedule for follow up appointment ( double book a physical)    Which lab does she use as I have to order labs again     Dr Hassan Roch

## 2022-09-30 DIAGNOSIS — Z12.31 VISIT FOR SCREENING MAMMOGRAM: Primary | ICD-10-CM

## 2023-03-11 ENCOUNTER — APPOINTMENT (OUTPATIENT)
Dept: LAB | Facility: MEDICAL CENTER | Age: 58
End: 2023-03-11

## 2023-03-11 DIAGNOSIS — Z86.010 PERSONAL HISTORY OF COLONIC POLYPS: ICD-10-CM

## 2023-03-11 DIAGNOSIS — I10 ESSENTIAL HYPERTENSION, MALIGNANT: ICD-10-CM

## 2023-03-11 DIAGNOSIS — E78.5 HYPERLIPIDEMIA, UNSPECIFIED HYPERLIPIDEMIA TYPE: ICD-10-CM

## 2023-03-11 LAB
ALBUMIN SERPL BCP-MCNC: 4.2 G/DL (ref 3.5–5)
ALP SERPL-CCNC: 88 U/L (ref 46–116)
ALT SERPL W P-5'-P-CCNC: 34 U/L (ref 12–78)
ANION GAP SERPL CALCULATED.3IONS-SCNC: 4 MMOL/L (ref 4–13)
AST SERPL W P-5'-P-CCNC: 18 U/L (ref 5–45)
BASOPHILS # BLD AUTO: 0.06 THOUSANDS/ÂΜL (ref 0–0.1)
BASOPHILS NFR BLD AUTO: 1 % (ref 0–1)
BILIRUB SERPL-MCNC: 0.53 MG/DL (ref 0.2–1)
BUN SERPL-MCNC: 16 MG/DL (ref 5–25)
CALCIUM SERPL-MCNC: 9.5 MG/DL (ref 8.3–10.1)
CHLORIDE SERPL-SCNC: 108 MMOL/L (ref 96–108)
CHOLEST SERPL-MCNC: 227 MG/DL
CO2 SERPL-SCNC: 25 MMOL/L (ref 21–32)
CREAT SERPL-MCNC: 0.87 MG/DL (ref 0.6–1.3)
EOSINOPHIL # BLD AUTO: 0.18 THOUSAND/ÂΜL (ref 0–0.61)
EOSINOPHIL NFR BLD AUTO: 3 % (ref 0–6)
ERYTHROCYTE [DISTWIDTH] IN BLOOD BY AUTOMATED COUNT: 12.1 % (ref 11.6–15.1)
GFR SERPL CREATININE-BSD FRML MDRD: 74 ML/MIN/1.73SQ M
GLUCOSE P FAST SERPL-MCNC: 95 MG/DL (ref 65–99)
HCT VFR BLD AUTO: 44.5 % (ref 34.8–46.1)
HDLC SERPL-MCNC: 53 MG/DL
HGB BLD-MCNC: 14.8 G/DL (ref 11.5–15.4)
IMM GRANULOCYTES # BLD AUTO: 0.05 THOUSAND/UL (ref 0–0.2)
IMM GRANULOCYTES NFR BLD AUTO: 1 % (ref 0–2)
LDLC SERPL CALC-MCNC: 160 MG/DL (ref 0–100)
LYMPHOCYTES # BLD AUTO: 1.99 THOUSANDS/ÂΜL (ref 0.6–4.47)
LYMPHOCYTES NFR BLD AUTO: 37 % (ref 14–44)
MCH RBC QN AUTO: 30.7 PG (ref 26.8–34.3)
MCHC RBC AUTO-ENTMCNC: 33.3 G/DL (ref 31.4–37.4)
MCV RBC AUTO: 92 FL (ref 82–98)
MONOCYTES # BLD AUTO: 0.49 THOUSAND/ÂΜL (ref 0.17–1.22)
MONOCYTES NFR BLD AUTO: 9 % (ref 4–12)
NEUTROPHILS # BLD AUTO: 2.56 THOUSANDS/ÂΜL (ref 1.85–7.62)
NEUTS SEG NFR BLD AUTO: 49 % (ref 43–75)
NONHDLC SERPL-MCNC: 174 MG/DL
NRBC BLD AUTO-RTO: 0 /100 WBCS
PLATELET # BLD AUTO: 271 THOUSANDS/UL (ref 149–390)
PMV BLD AUTO: 10.2 FL (ref 8.9–12.7)
POTASSIUM SERPL-SCNC: 3.9 MMOL/L (ref 3.5–5.3)
PROT SERPL-MCNC: 7.2 G/DL (ref 6.4–8.4)
RBC # BLD AUTO: 4.82 MILLION/UL (ref 3.81–5.12)
SODIUM SERPL-SCNC: 137 MMOL/L (ref 135–147)
TRIGL SERPL-MCNC: 72 MG/DL
TSH SERPL DL<=0.05 MIU/L-ACNC: 2.23 UIU/ML (ref 0.45–4.5)
WBC # BLD AUTO: 5.33 THOUSAND/UL (ref 4.31–10.16)

## 2023-11-15 ENCOUNTER — APPOINTMENT (OUTPATIENT)
Dept: LAB | Facility: MEDICAL CENTER | Age: 58
End: 2023-11-15
Payer: COMMERCIAL

## 2023-11-15 DIAGNOSIS — E78.5 HYPERLIPIDEMIA, UNSPECIFIED HYPERLIPIDEMIA TYPE: ICD-10-CM

## 2023-11-15 LAB
CHOLEST SERPL-MCNC: 170 MG/DL
HDLC SERPL-MCNC: 48 MG/DL
LDLC SERPL CALC-MCNC: 90 MG/DL (ref 0–100)
NONHDLC SERPL-MCNC: 122 MG/DL
TRIGL SERPL-MCNC: 161 MG/DL

## 2023-11-15 PROCEDURE — 80061 LIPID PANEL: CPT

## 2023-11-15 PROCEDURE — 36415 COLL VENOUS BLD VENIPUNCTURE: CPT

## 2025-04-28 ENCOUNTER — PREP FOR PROCEDURE (OUTPATIENT)
Age: 60
End: 2025-04-28

## 2025-04-28 ENCOUNTER — TELEPHONE (OUTPATIENT)
Age: 60
End: 2025-04-28

## 2025-04-28 DIAGNOSIS — Z12.11 SCREENING FOR COLON CANCER: Primary | ICD-10-CM

## 2025-04-28 NOTE — TELEPHONE ENCOUNTER
"Scheduled date of colonoscopy (as of today): 5/27/25  Physician performing colonoscopy: Ayad  Location of colonoscopy: [facility]  Bowel prep reviewed with patient: Alina/Francois   Instructions mailed to home   Clearances: N/A    04/28/25  Screened by: Macie Og MA    Referring Provider     Pre- Screening: HT 5'3\"  WT: 138 lbs  BMI: 24.4      Has patient been referred for a routine screening Colonoscopy? yes  Is the patient between 45-75 years old? yes      Previous Colonoscopy yes   If yes:    Date: 2021    Facility: [facility]    Reason:     Does the patient want to see a Gastroenterologist prior to their procedure OR are they having any GI symptoms? no    Has the patient been hospitalized or had abdominal surgery in the past 6 months? no    Does the patient use supplemental oxygen? no    Does the patient take Coumadin, Lovenox, Plavix, Elliquis, Xarelto, or other blood thinning medication? no    Has the patient had a stroke, cardiac event, or stent placed in the past year? no    If patient is between 45yrs - 49yrs, please advise patient that we will have to confirm benefits & coverage with their insurance company for a routine screening colonoscopy.    "

## 2025-05-13 ENCOUNTER — ANESTHESIA (OUTPATIENT)
Dept: ANESTHESIOLOGY | Facility: HOSPITAL | Age: 60
End: 2025-05-13

## 2025-05-13 ENCOUNTER — ANESTHESIA EVENT (OUTPATIENT)
Dept: ANESTHESIOLOGY | Facility: HOSPITAL | Age: 60
End: 2025-05-13

## 2025-05-27 ENCOUNTER — ANESTHESIA (OUTPATIENT)
Dept: GASTROENTEROLOGY | Facility: AMBULARY SURGERY CENTER | Age: 60
End: 2025-05-27
Payer: COMMERCIAL

## 2025-05-27 ENCOUNTER — HOSPITAL ENCOUNTER (OUTPATIENT)
Dept: GASTROENTEROLOGY | Facility: AMBULARY SURGERY CENTER | Age: 60
Setting detail: OUTPATIENT SURGERY
Discharge: HOME | End: 2025-05-27
Attending: INTERNAL MEDICINE
Payer: COMMERCIAL

## 2025-05-27 VITALS
SYSTOLIC BLOOD PRESSURE: 96 MMHG | RESPIRATION RATE: 18 BRPM | TEMPERATURE: 97 F | DIASTOLIC BLOOD PRESSURE: 57 MMHG | OXYGEN SATURATION: 98 % | HEART RATE: 72 BPM

## 2025-05-27 DIAGNOSIS — Z12.11 SCREENING FOR COLON CANCER: ICD-10-CM

## 2025-05-27 PROCEDURE — 45385 COLONOSCOPY W/LESION REMOVAL: CPT | Performed by: INTERNAL MEDICINE

## 2025-05-27 PROCEDURE — 88305 TISSUE EXAM BY PATHOLOGIST: CPT | Performed by: STUDENT IN AN ORGANIZED HEALTH CARE EDUCATION/TRAINING PROGRAM

## 2025-05-27 RX ORDER — SODIUM CHLORIDE, SODIUM LACTATE, POTASSIUM CHLORIDE, CALCIUM CHLORIDE 600; 310; 30; 20 MG/100ML; MG/100ML; MG/100ML; MG/100ML
INJECTION, SOLUTION INTRAVENOUS CONTINUOUS PRN
Status: DISCONTINUED | OUTPATIENT
Start: 2025-05-27 | End: 2025-05-27

## 2025-05-27 RX ORDER — LIDOCAINE HYDROCHLORIDE 10 MG/ML
INJECTION, SOLUTION EPIDURAL; INFILTRATION; INTRACAUDAL; PERINEURAL AS NEEDED
Status: DISCONTINUED | OUTPATIENT
Start: 2025-05-27 | End: 2025-05-27

## 2025-05-27 RX ORDER — PROPOFOL 10 MG/ML
INJECTION, EMULSION INTRAVENOUS AS NEEDED
Status: DISCONTINUED | OUTPATIENT
Start: 2025-05-27 | End: 2025-05-27

## 2025-05-27 RX ORDER — PROPOFOL 10 MG/ML
INJECTION, EMULSION INTRAVENOUS CONTINUOUS PRN
Status: DISCONTINUED | OUTPATIENT
Start: 2025-05-27 | End: 2025-05-27

## 2025-05-27 RX ADMIN — PROPOFOL 100 MG: 10 INJECTION, EMULSION INTRAVENOUS at 11:46

## 2025-05-27 RX ADMIN — LIDOCAINE HYDROCHLORIDE 50 MG: 10 INJECTION, SOLUTION EPIDURAL; INFILTRATION; INTRACAUDAL at 11:46

## 2025-05-27 RX ADMIN — SODIUM CHLORIDE, SODIUM LACTATE, POTASSIUM CHLORIDE, AND CALCIUM CHLORIDE: .6; .31; .03; .02 INJECTION, SOLUTION INTRAVENOUS at 11:37

## 2025-05-27 RX ADMIN — PROPOFOL 30 MG: 10 INJECTION, EMULSION INTRAVENOUS at 11:48

## 2025-05-27 RX ADMIN — PROPOFOL 140 MCG/KG/MIN: 10 INJECTION, EMULSION INTRAVENOUS at 11:48

## 2025-05-27 RX ADMIN — Medication 40 MG: at 11:50

## 2025-05-27 NOTE — ANESTHESIA POSTPROCEDURE EVALUATION
Post-Op Assessment Note    CV Status:  Stable  Pain Score: 0    Pain management: adequate       Mental Status:  Sleepy   Hydration Status:  Stable   PONV Controlled:  None   Airway Patency:  Patent  Two or more mitigation strategies used for obstructive sleep apnea   Post Op Vitals Reviewed: Yes    No anethesia notable event occurred.    Staff: CRNA           Last Filed PACU Vitals:  Vitals Value Taken Time   Temp     Pulse 71    BP 97/55    Resp 16    SpO2 99

## 2025-05-27 NOTE — ANESTHESIA PREPROCEDURE EVALUATION
Procedure:  COLONOSCOPY    Relevant Problems   ANESTHESIA   (-) History of anesthesia complications      CARDIO   (+) Hypertension   (-) Chest pain   (-) CRUMP (dyspnea on exertion)      PULMONARY   (-) Shortness of breath   (-) Sleep apnea   (-) URI (upper respiratory infection)        Physical Exam    Airway     Mallampati score: II  TM Distance: >3 FB  Neck ROM: full      Cardiovascular      Dental       Pulmonary      Neurological      Other Findings  post-pubertal.      Anesthesia Plan  ASA Score- 2     Anesthesia Type- IV sedation with anesthesia with ASA Monitors.         Additional Monitors:     Airway Plan: natural airway.           Plan Factors-Exercise tolerance (METS): >4 METS.    Chart reviewed. EKG reviewed.  Existing labs reviewed. Patient summary reviewed.                  Induction- intravenous.    Postoperative Plan- .   Monitoring Plan - Monitoring plan - standard ASA monitoring          Informed Consent- Anesthetic plan and risks discussed with patient.  I personally reviewed this patient with the CRNA. Discussed and agreed on the Anesthesia Plan with the CRNA..      NPO Status:  Vitals Value Taken Time   Date of last liquid 05/26/25 05/27/25 10:05   Time of last liquid 2100 05/27/25 10:05   Date of last solid 05/25/25 05/27/25 10:05   Time of last solid 2200 05/27/25 10:05

## 2025-05-27 NOTE — H&P
History and Physical -  [facility]  Scarlet Walters 59 y.o. female MRN: [id number]                  HPI: Scarlet Walters is a 59 y.o. year old female who presents for personal history of colon polyps.      REVIEW OF SYSTEMS: Per the HPI, and otherwise unremarkable.    Historical Information   Past Medical History[1]  Past Surgical History[2]  Social History   Social History     Substance and Sexual Activity   Alcohol Use Yes    Comment: social     Social History     Substance and Sexual Activity   Drug Use No     Tobacco Use History[3]  Family History[4]    Meds/Allergies     Current Medications[5]    Allergies[6]    Objective     /73   Pulse 79   Temp (!) 97.3 °F (36.3 °C) (Temporal)   Resp 16   SpO2 95%       PHYSICAL EXAM    Gen: NAD  Head: NCAT  CV: RRR  CHEST: Clear  ABD: soft, NT/ND  EXT: no edema      ASSESSMENT/PLAN:  This is a 59 y.o. year old female here for colonoscopy, and she is stable and optimized for her procedure.             [1]   Past Medical History:  Diagnosis Date    Colon polyp     Hypertension    [2]   Past Surgical History:  Procedure Laterality Date    CHOLECYSTECTOMY      COLONOSCOPY      HYSTERECTOMY      WISDOM TOOTH EXTRACTION     [3]   Social History  Tobacco Use   Smoking Status Former   Smokeless Tobacco Never   Tobacco Comments    former smoker, per allscripts   [4]   Family History  Problem Relation Name Age of Onset    Hypertension Mother      Hyperlipidemia Mother      Hypertension Father      Colon cancer Paternal Grandfather     [5]   Current Outpatient Medications:     cholecalciferol (VITAMIN D3) 1,000 units tablet    lisinopril-hydrochlorothiazide (PRINZIDE,ZESTORETIC) 10-12.5 MG per tablet  [6] No Known Allergies

## 2025-05-30 PROCEDURE — 88305 TISSUE EXAM BY PATHOLOGIST: CPT | Performed by: STUDENT IN AN ORGANIZED HEALTH CARE EDUCATION/TRAINING PROGRAM

## 2025-06-02 ENCOUNTER — RESULTS FOLLOW-UP (OUTPATIENT)
Age: 60
End: 2025-06-02

## 2025-07-01 ENCOUNTER — APPOINTMENT (OUTPATIENT)
Dept: LAB | Facility: MEDICAL CENTER | Age: 60
End: 2025-07-01
Attending: FAMILY MEDICINE
Payer: COMMERCIAL

## 2025-07-01 DIAGNOSIS — E55.9 AVITAMINOSIS D: ICD-10-CM

## 2025-07-01 DIAGNOSIS — E78.5 HYPERLIPIDEMIA, UNSPECIFIED HYPERLIPIDEMIA TYPE: ICD-10-CM

## 2025-07-01 DIAGNOSIS — I10 ESSENTIAL HYPERTENSION, BENIGN: ICD-10-CM

## 2025-07-01 LAB
25(OH)D3 SERPL-MCNC: 88.6 NG/ML (ref 30–100)
ALBUMIN SERPL BCG-MCNC: 4.5 G/DL (ref 3.5–5)
ALP SERPL-CCNC: 124 U/L (ref 34–104)
ALT SERPL W P-5'-P-CCNC: 33 U/L (ref 7–52)
ANION GAP SERPL CALCULATED.3IONS-SCNC: 13 MMOL/L (ref 4–13)
AST SERPL W P-5'-P-CCNC: 26 U/L (ref 13–39)
BASOPHILS # BLD MANUAL: 0.18 THOUSAND/UL (ref 0–0.1)
BASOPHILS NFR MAR MANUAL: 2 % (ref 0–1)
BILIRUB SERPL-MCNC: 0.57 MG/DL (ref 0.2–1)
BUN SERPL-MCNC: 33 MG/DL (ref 5–25)
CALCIUM SERPL-MCNC: 9.9 MG/DL (ref 8.4–10.2)
CHLORIDE SERPL-SCNC: 96 MMOL/L (ref 96–108)
CHOLEST SERPL-MCNC: 214 MG/DL (ref ?–200)
CO2 SERPL-SCNC: 24 MMOL/L (ref 21–32)
CREAT SERPL-MCNC: 1.2 MG/DL (ref 0.6–1.3)
EOSINOPHIL # BLD MANUAL: 0.26 THOUSAND/UL (ref 0–0.4)
EOSINOPHIL NFR BLD MANUAL: 3 % (ref 0–6)
ERYTHROCYTE [DISTWIDTH] IN BLOOD BY AUTOMATED COUNT: 12.2 % (ref 11.6–15.1)
GFR SERPL CREATININE-BSD FRML MDRD: 49 ML/MIN/1.73SQ M
GIANT PLATELETS BLD QL SMEAR: PRESENT
GLUCOSE P FAST SERPL-MCNC: 94 MG/DL (ref 65–99)
HCT VFR BLD AUTO: 43.8 % (ref 34.8–46.1)
HDLC SERPL-MCNC: 49 MG/DL
HGB BLD-MCNC: 14.9 G/DL (ref 11.5–15.4)
LDLC SERPL CALC-MCNC: 143 MG/DL (ref 0–100)
LYMPHOCYTES # BLD AUTO: 19 % (ref 14–44)
LYMPHOCYTES # BLD AUTO: 2.36 THOUSAND/UL (ref 0.6–4.47)
MCH RBC QN AUTO: 32.1 PG (ref 26.8–34.3)
MCHC RBC AUTO-ENTMCNC: 34 G/DL (ref 31.4–37.4)
MCV RBC AUTO: 94 FL (ref 82–98)
METAMYELOCYTE ABSOLUTE CT: 0.09 THOUSAND/UL (ref 0–0.1)
METAMYELOCYTES NFR BLD MANUAL: 1 % (ref 0–1)
MONOCYTES # BLD AUTO: 0.35 THOUSAND/UL (ref 0–1.22)
MONOCYTES NFR BLD: 4 % (ref 4–12)
MYELOCYTE ABSOLUTE CT: 0.18 THOUSAND/UL (ref 0–0.1)
MYELOCYTES NFR BLD MANUAL: 2 % (ref 0–1)
NEUTROPHILS # BLD MANUAL: 5.34 THOUSAND/UL (ref 1.85–7.62)
NEUTS SEG NFR BLD AUTO: 61 % (ref 43–75)
NONHDLC SERPL-MCNC: 165 MG/DL
PLATELET # BLD AUTO: 408 THOUSANDS/UL (ref 149–390)
PLATELET BLD QL SMEAR: ADEQUATE
PLATELET CLUMP BLD QL SMEAR: PRESENT
PMV BLD AUTO: 9.3 FL (ref 8.9–12.7)
POIKILOCYTOSIS BLD QL SMEAR: PRESENT
POTASSIUM SERPL-SCNC: 4 MMOL/L (ref 3.5–5.3)
PROT SERPL-MCNC: 7.8 G/DL (ref 6.4–8.4)
RBC # BLD AUTO: 4.64 MILLION/UL (ref 3.81–5.12)
RBC MORPH BLD: PRESENT
SODIUM SERPL-SCNC: 133 MMOL/L (ref 135–147)
TRIGL SERPL-MCNC: 109 MG/DL (ref ?–150)
TSH SERPL DL<=0.05 MIU/L-ACNC: 3.12 UIU/ML (ref 0.45–4.5)
VARIANT LYMPHS # BLD AUTO: 8 %
WBC # BLD AUTO: 8.75 THOUSAND/UL (ref 4.31–10.16)

## 2025-07-01 PROCEDURE — 85027 COMPLETE CBC AUTOMATED: CPT

## 2025-07-01 PROCEDURE — 85007 BL SMEAR W/DIFF WBC COUNT: CPT

## 2025-07-01 PROCEDURE — 82306 VITAMIN D 25 HYDROXY: CPT

## 2025-07-01 PROCEDURE — 80061 LIPID PANEL: CPT

## 2025-07-01 PROCEDURE — 84443 ASSAY THYROID STIM HORMONE: CPT

## 2025-07-01 PROCEDURE — 80053 COMPREHEN METABOLIC PANEL: CPT

## 2025-07-01 PROCEDURE — 36415 COLL VENOUS BLD VENIPUNCTURE: CPT

## 2025-07-22 ENCOUNTER — PREP FOR PROCEDURE (OUTPATIENT)
Dept: SURGERY | Facility: CLINIC | Age: 60
End: 2025-07-22

## 2025-07-22 ENCOUNTER — OFFICE VISIT (OUTPATIENT)
Dept: SURGERY | Facility: CLINIC | Age: 60
End: 2025-07-22
Payer: COMMERCIAL

## 2025-07-22 VITALS
HEART RATE: 97 BPM | DIASTOLIC BLOOD PRESSURE: 86 MMHG | OXYGEN SATURATION: 96 % | BODY MASS INDEX: 28.32 KG/M2 | SYSTOLIC BLOOD PRESSURE: 132 MMHG | WEIGHT: 150 LBS | HEIGHT: 61 IN

## 2025-07-22 DIAGNOSIS — K43.2 INCISIONAL HERNIA: Primary | ICD-10-CM

## 2025-07-22 DIAGNOSIS — K43.2 INCISIONAL HERNIA WITHOUT OBSTRUCTION OR GANGRENE: Primary | ICD-10-CM

## 2025-07-22 PROCEDURE — 99203 OFFICE O/P NEW LOW 30 MIN: CPT | Performed by: SURGERY

## 2025-07-22 RX ORDER — CHROMIUM 200 MCG
TABLET ORAL
COMMUNITY

## 2025-07-22 RX ORDER — LISINOPRIL AND HYDROCHLOROTHIAZIDE 12.5; 2 MG/1; MG/1
TABLET ORAL
COMMUNITY
Start: 2025-07-02

## 2025-07-22 RX ORDER — ELECTROLYTES/DEXTROSE
SOLUTION, ORAL ORAL
COMMUNITY

## 2025-07-22 NOTE — PROGRESS NOTES
"Name: Scarlet Walters      : 1965      MRN: [id number]  Encounter Provider: Curtis Morelos MD  Encounter Date: 2025   Encounter department: [facility]  :  Assessment & Plan  Incisional hernia without obstruction or gangrene             History of Present Illness   HPI  Scarlet Walters is a 59 y.o. female who presents for left inguinal hernia consult.  States she has had a bulge with coughing LLQ for 3 months.  She has intermittent dull burning pain LLQ.  Limits her activities.    Examination reveals an incisional hernia just to the left of her midline lower abdominal incision.  This is an area where she had a hysterectomy in the past.  The hernia lies medial to the inguinal canal.    Operative repair is recommended.  Risks and benefits explained.  Patient is agreeable.    Patient's chart was reviewed for notations radiology and testing for this consultation.  Patient reviewed the medication list and changes were made accordingly.  Preadmission testing was ordered.  Anticipate prescription medication management perioperatively.  Pre and postoperative instructions with medication management discussed.      Review of Systems   Constitutional: Negative.  Negative for activity change.   HENT: Negative.     Eyes: Negative.    Respiratory: Negative.     Cardiovascular: Negative.    Gastrointestinal:  Positive for abdominal pain and diarrhea.   Endocrine: Negative.    Genitourinary: Negative.    Musculoskeletal: Negative.    Skin: Negative.    Allergic/Immunologic: Negative.    Neurological: Negative.    Psychiatric/Behavioral:  Negative for agitation, behavioral problems and confusion. The patient is not nervous/anxious.           Objective   /86   Pulse 97   Ht 5' 1\" (1.549 m)   Wt 68 kg (150 lb)   SpO2 96%   BMI 28.34 kg/m²      Physical Exam    Cardiovascular:      Rate and Rhythm: Normal rate and regular rhythm.   Pulmonary:      Effort: Pulmonary effort is normal.    "   Breath sounds: Normal breath sounds.   Abdominal:      Hernia: A hernia is present.     Skin:     General: Skin is warm and dry.

## 2025-08-01 ENCOUNTER — OFFICE VISIT (OUTPATIENT)
Dept: LAB | Facility: CLINIC | Age: 60
End: 2025-08-01
Attending: SURGERY
Payer: COMMERCIAL

## 2025-08-01 DIAGNOSIS — K43.2 INCISIONAL HERNIA: ICD-10-CM

## 2025-08-01 LAB
ATRIAL RATE: 65 BPM
P AXIS: 54 DEGREES
PR INTERVAL: 142 MS
QRS AXIS: 30 DEGREES
QRSD INTERVAL: 82 MS
QT INTERVAL: 386 MS
QTC INTERVAL: 402 MS
T WAVE AXIS: 44 DEGREES
VENTRICULAR RATE: 65 BPM

## 2025-08-01 PROCEDURE — 93010 ELECTROCARDIOGRAM REPORT: CPT | Performed by: STUDENT IN AN ORGANIZED HEALTH CARE EDUCATION/TRAINING PROGRAM

## 2025-08-01 PROCEDURE — 93005 ELECTROCARDIOGRAM TRACING: CPT

## 2025-08-07 ENCOUNTER — ANESTHESIA (OUTPATIENT)
Dept: ANESTHESIOLOGY | Facility: HOSPITAL | Age: 60
End: 2025-08-07

## 2025-08-07 ENCOUNTER — ANESTHESIA EVENT (OUTPATIENT)
Dept: ANESTHESIOLOGY | Facility: HOSPITAL | Age: 60
End: 2025-08-07

## 2025-08-19 RX ORDER — OXYCODONE AND ACETAMINOPHEN 5; 325 MG/1; MG/1
1 TABLET ORAL EVERY 4 HOURS PRN
Qty: 10 TABLET | Refills: 0 | Status: SHIPPED | OUTPATIENT
Start: 2025-08-19

## 2025-08-20 ENCOUNTER — ANESTHESIA EVENT (OUTPATIENT)
Dept: PERIOP | Facility: AMBULARY SURGERY CENTER | Age: 60
End: 2025-08-20
Payer: COMMERCIAL

## 2025-08-20 RX ORDER — SODIUM CHLORIDE, SODIUM LACTATE, POTASSIUM CHLORIDE, CALCIUM CHLORIDE 600; 310; 30; 20 MG/100ML; MG/100ML; MG/100ML; MG/100ML
125 INJECTION, SOLUTION INTRAVENOUS CONTINUOUS
Status: CANCELLED | OUTPATIENT
Start: 2025-08-20

## 2025-08-21 ENCOUNTER — ANESTHESIA (OUTPATIENT)
Dept: PERIOP | Facility: AMBULARY SURGERY CENTER | Age: 60
End: 2025-08-21
Payer: COMMERCIAL

## 2025-08-21 ENCOUNTER — HOSPITAL ENCOUNTER (OUTPATIENT)
Facility: AMBULARY SURGERY CENTER | Age: 60
Setting detail: OUTPATIENT SURGERY
Discharge: HOME | End: 2025-08-21
Attending: SURGERY | Admitting: SURGERY
Payer: COMMERCIAL

## 2025-08-21 VITALS
HEART RATE: 60 BPM | TEMPERATURE: 97 F | WEIGHT: 148 LBS | RESPIRATION RATE: 18 BRPM | HEIGHT: 60 IN | DIASTOLIC BLOOD PRESSURE: 64 MMHG | OXYGEN SATURATION: 99 % | SYSTOLIC BLOOD PRESSURE: 128 MMHG | BODY MASS INDEX: 29.06 KG/M2

## 2025-08-21 DIAGNOSIS — K43.2 INCISIONAL HERNIA WITHOUT OBSTRUCTION OR GANGRENE: Primary | ICD-10-CM

## 2025-08-21 PROCEDURE — C1781 MESH (IMPLANTABLE): HCPCS | Performed by: SURGERY

## 2025-08-21 PROCEDURE — 49591 RPR AA HRN 1ST < 3 CM RDC: CPT | Performed by: SURGERY

## 2025-08-21 PROCEDURE — NC001 PR NO CHARGE: Performed by: SURGERY

## 2025-08-21 DEVICE — IMPLANTABLE DEVICE: Type: IMPLANTABLE DEVICE | Site: ABDOMEN | Status: FUNCTIONAL

## 2025-08-21 RX ORDER — CEFAZOLIN SODIUM 2 G/50ML
2000 SOLUTION INTRAVENOUS ONCE
Status: COMPLETED | OUTPATIENT
Start: 2025-08-21 | End: 2025-08-21

## 2025-08-21 RX ORDER — ONDANSETRON 2 MG/ML
INJECTION INTRAMUSCULAR; INTRAVENOUS AS NEEDED
Status: DISCONTINUED | OUTPATIENT
Start: 2025-08-21 | End: 2025-08-21

## 2025-08-21 RX ORDER — FENTANYL CITRATE 50 UG/ML
INJECTION, SOLUTION INTRAMUSCULAR; INTRAVENOUS AS NEEDED
Status: DISCONTINUED | OUTPATIENT
Start: 2025-08-21 | End: 2025-08-21

## 2025-08-21 RX ORDER — ONDANSETRON 2 MG/ML
4 INJECTION INTRAMUSCULAR; INTRAVENOUS ONCE AS NEEDED
Status: DISCONTINUED | OUTPATIENT
Start: 2025-08-21 | End: 2025-08-21 | Stop reason: HOSPADM

## 2025-08-21 RX ORDER — SODIUM CHLORIDE, SODIUM LACTATE, POTASSIUM CHLORIDE, CALCIUM CHLORIDE 600; 310; 30; 20 MG/100ML; MG/100ML; MG/100ML; MG/100ML
100 INJECTION, SOLUTION INTRAVENOUS CONTINUOUS
Status: DISCONTINUED | OUTPATIENT
Start: 2025-08-21 | End: 2025-08-21 | Stop reason: HOSPADM

## 2025-08-21 RX ORDER — ONDANSETRON 2 MG/ML
4 INJECTION INTRAMUSCULAR; INTRAVENOUS EVERY 4 HOURS PRN
Status: DISCONTINUED | OUTPATIENT
Start: 2025-08-21 | End: 2025-08-21 | Stop reason: HOSPADM

## 2025-08-21 RX ORDER — KETOROLAC TROMETHAMINE 30 MG/ML
INJECTION, SOLUTION INTRAMUSCULAR; INTRAVENOUS AS NEEDED
Status: DISCONTINUED | OUTPATIENT
Start: 2025-08-21 | End: 2025-08-21

## 2025-08-21 RX ORDER — PROPOFOL 10 MG/ML
INJECTION, EMULSION INTRAVENOUS AS NEEDED
Status: DISCONTINUED | OUTPATIENT
Start: 2025-08-21 | End: 2025-08-21

## 2025-08-21 RX ORDER — SODIUM CHLORIDE, SODIUM LACTATE, POTASSIUM CHLORIDE, CALCIUM CHLORIDE 600; 310; 30; 20 MG/100ML; MG/100ML; MG/100ML; MG/100ML
INJECTION, SOLUTION INTRAVENOUS CONTINUOUS PRN
Status: DISCONTINUED | OUTPATIENT
Start: 2025-08-21 | End: 2025-08-21

## 2025-08-21 RX ORDER — MIDAZOLAM HYDROCHLORIDE 2 MG/2ML
INJECTION, SOLUTION INTRAMUSCULAR; INTRAVENOUS AS NEEDED
Status: DISCONTINUED | OUTPATIENT
Start: 2025-08-21 | End: 2025-08-21

## 2025-08-21 RX ORDER — OXYCODONE AND ACETAMINOPHEN 5; 325 MG/1; MG/1
1 TABLET ORAL EVERY 4 HOURS PRN
Refills: 0 | Status: DISCONTINUED | OUTPATIENT
Start: 2025-08-21 | End: 2025-08-21 | Stop reason: HOSPADM

## 2025-08-21 RX ORDER — LIDOCAINE HYDROCHLORIDE 10 MG/ML
INJECTION, SOLUTION EPIDURAL; INFILTRATION; INTRACAUDAL; PERINEURAL AS NEEDED
Status: DISCONTINUED | OUTPATIENT
Start: 2025-08-21 | End: 2025-08-21

## 2025-08-21 RX ORDER — ACETAMINOPHEN 325 MG/1
650 TABLET ORAL EVERY 4 HOURS PRN
Status: DISCONTINUED | OUTPATIENT
Start: 2025-08-21 | End: 2025-08-21 | Stop reason: HOSPADM

## 2025-08-21 RX ORDER — MAGNESIUM HYDROXIDE 1200 MG/15ML
LIQUID ORAL AS NEEDED
Status: DISCONTINUED | OUTPATIENT
Start: 2025-08-21 | End: 2025-08-21 | Stop reason: HOSPADM

## 2025-08-21 RX ORDER — FENTANYL CITRATE/PF 50 MCG/ML
50 SYRINGE (ML) INJECTION
Status: DISCONTINUED | OUTPATIENT
Start: 2025-08-21 | End: 2025-08-21 | Stop reason: HOSPADM

## 2025-08-21 RX ORDER — BUPIVACAINE HYDROCHLORIDE AND EPINEPHRINE 2.5; 5 MG/ML; UG/ML
INJECTION, SOLUTION EPIDURAL; INFILTRATION; INTRACAUDAL; PERINEURAL AS NEEDED
Status: DISCONTINUED | OUTPATIENT
Start: 2025-08-21 | End: 2025-08-21 | Stop reason: HOSPADM

## 2025-08-21 RX ORDER — DEXAMETHASONE SODIUM PHOSPHATE 10 MG/ML
INJECTION, SOLUTION INTRAMUSCULAR; INTRAVENOUS AS NEEDED
Status: DISCONTINUED | OUTPATIENT
Start: 2025-08-21 | End: 2025-08-21

## 2025-08-21 RX ORDER — HYDROMORPHONE HCL/PF 1 MG/ML
0.5 SYRINGE (ML) INJECTION
Status: DISCONTINUED | OUTPATIENT
Start: 2025-08-21 | End: 2025-08-21 | Stop reason: HOSPADM

## 2025-08-21 RX ORDER — HYDROMORPHONE HCL/PF 1 MG/ML
0.5 SYRINGE (ML) INJECTION
Refills: 0 | Status: DISCONTINUED | OUTPATIENT
Start: 2025-08-21 | End: 2025-08-21 | Stop reason: HOSPADM

## 2025-08-21 RX ADMIN — PROPOFOL 150 MG: 10 INJECTION, EMULSION INTRAVENOUS at 07:29

## 2025-08-21 RX ADMIN — KETOROLAC TROMETHAMINE 15 MG: 30 INJECTION, SOLUTION INTRAMUSCULAR; INTRAVENOUS at 08:00

## 2025-08-21 RX ADMIN — FENTANYL CITRATE 50 MCG: 50 INJECTION INTRAMUSCULAR; INTRAVENOUS at 07:29

## 2025-08-21 RX ADMIN — SODIUM CHLORIDE, SODIUM LACTATE, POTASSIUM CHLORIDE, AND CALCIUM CHLORIDE: .6; .31; .03; .02 INJECTION, SOLUTION INTRAVENOUS at 07:15

## 2025-08-21 RX ADMIN — CEFAZOLIN SODIUM 2000 MG: 2 SOLUTION INTRAVENOUS at 07:26

## 2025-08-21 RX ADMIN — LIDOCAINE HYDROCHLORIDE 50 MG: 10 INJECTION, SOLUTION EPIDURAL; INFILTRATION; INTRACAUDAL at 07:29

## 2025-08-21 RX ADMIN — FENTANYL CITRATE 50 MCG: 50 INJECTION INTRAMUSCULAR; INTRAVENOUS at 07:53

## 2025-08-21 RX ADMIN — ONDANSETRON 4 MG: 2 INJECTION INTRAMUSCULAR; INTRAVENOUS at 07:29

## 2025-08-21 RX ADMIN — DEXAMETHASONE SODIUM PHOSPHATE 10 MG: 10 INJECTION, SOLUTION INTRAMUSCULAR; INTRAVENOUS at 07:29

## 2025-08-21 RX ADMIN — MIDAZOLAM 2 MG: 1 INJECTION INTRAMUSCULAR; INTRAVENOUS at 07:23

## (undated) DEVICE — Device

## (undated) DEVICE — PACK PBDS GENERAL MINOR RF